# Patient Record
Sex: FEMALE | Race: WHITE | NOT HISPANIC OR LATINO | Employment: FULL TIME | URBAN - METROPOLITAN AREA
[De-identification: names, ages, dates, MRNs, and addresses within clinical notes are randomized per-mention and may not be internally consistent; named-entity substitution may affect disease eponyms.]

---

## 2021-11-17 ENCOUNTER — HOSPITAL ENCOUNTER (EMERGENCY)
Facility: CLINIC | Age: 36
Discharge: HOME | End: 2021-11-18
Attending: EMERGENCY MEDICINE | Admitting: EMERGENCY MEDICINE
Payer: COMMERCIAL

## 2021-11-17 DIAGNOSIS — R68.84 JAW PAIN: ICD-10-CM

## 2021-11-17 DIAGNOSIS — R42 LIGHTHEADEDNESS: ICD-10-CM

## 2021-11-17 DIAGNOSIS — F41.9 ANXIETY: ICD-10-CM

## 2021-11-17 PROCEDURE — 258N000003 HC RX IP 258 OP 636: Performed by: EMERGENCY MEDICINE

## 2021-11-17 PROCEDURE — 84443 ASSAY THYROID STIM HORMONE: CPT | Performed by: EMERGENCY MEDICINE

## 2021-11-17 PROCEDURE — 85610 PROTHROMBIN TIME: CPT | Performed by: EMERGENCY MEDICINE

## 2021-11-17 PROCEDURE — 96360 HYDRATION IV INFUSION INIT: CPT

## 2021-11-17 PROCEDURE — 93005 ELECTROCARDIOGRAM TRACING: CPT

## 2021-11-17 PROCEDURE — 84484 ASSAY OF TROPONIN QUANT: CPT | Performed by: EMERGENCY MEDICINE

## 2021-11-17 PROCEDURE — 80048 BASIC METABOLIC PNL TOTAL CA: CPT | Performed by: EMERGENCY MEDICINE

## 2021-11-17 PROCEDURE — 85025 COMPLETE CBC W/AUTO DIFF WBC: CPT | Performed by: EMERGENCY MEDICINE

## 2021-11-17 PROCEDURE — 99284 EMERGENCY DEPT VISIT MOD MDM: CPT | Mod: 25

## 2021-11-17 PROCEDURE — 36415 COLL VENOUS BLD VENIPUNCTURE: CPT | Performed by: EMERGENCY MEDICINE

## 2021-11-17 RX ADMIN — SODIUM CHLORIDE 1000 ML: 9 INJECTION, SOLUTION INTRAVENOUS at 23:57

## 2021-11-17 ASSESSMENT — MIFFLIN-ST. JEOR: SCORE: 1297.36

## 2021-11-18 VITALS
SYSTOLIC BLOOD PRESSURE: 150 MMHG | DIASTOLIC BLOOD PRESSURE: 89 MMHG | HEIGHT: 64 IN | TEMPERATURE: 97.9 F | RESPIRATION RATE: 16 BRPM | BODY MASS INDEX: 23.05 KG/M2 | WEIGHT: 135 LBS | OXYGEN SATURATION: 100 % | HEART RATE: 78 BPM

## 2021-11-18 LAB
ANION GAP SERPL CALCULATED.3IONS-SCNC: 6 MMOL/L (ref 3–14)
ATRIAL RATE - MUSE: 63 BPM
BASOPHILS # BLD AUTO: 0.1 10E3/UL (ref 0–0.2)
BASOPHILS NFR BLD AUTO: 1 %
BUN SERPL-MCNC: 10 MG/DL (ref 7–30)
CALCIUM SERPL-MCNC: 9 MG/DL (ref 8.5–10.1)
CHLORIDE BLD-SCNC: 107 MMOL/L (ref 94–109)
CO2 SERPL-SCNC: 25 MMOL/L (ref 20–32)
CREAT SERPL-MCNC: 0.68 MG/DL (ref 0.52–1.04)
DIASTOLIC BLOOD PRESSURE - MUSE: NORMAL MMHG
EOSINOPHIL # BLD AUTO: 0.1 10E3/UL (ref 0–0.7)
EOSINOPHIL NFR BLD AUTO: 1 %
ERYTHROCYTE [DISTWIDTH] IN BLOOD BY AUTOMATED COUNT: 12.7 % (ref 10–15)
GFR SERPL CREATININE-BSD FRML MDRD: >90 ML/MIN/1.73M2
GLUCOSE BLD-MCNC: 98 MG/DL (ref 70–99)
HCT VFR BLD AUTO: 40.7 % (ref 35–47)
HGB BLD-MCNC: 13.7 G/DL (ref 11.7–15.7)
IMM GRANULOCYTES # BLD: 0 10E3/UL
IMM GRANULOCYTES NFR BLD: 0 %
INR PPP: 0.97 (ref 0.85–1.15)
INTERPRETATION ECG - MUSE: NORMAL
LYMPHOCYTES # BLD AUTO: 4.2 10E3/UL (ref 0.8–5.3)
LYMPHOCYTES NFR BLD AUTO: 41 %
MCH RBC QN AUTO: 29.1 PG (ref 26.5–33)
MCHC RBC AUTO-ENTMCNC: 33.7 G/DL (ref 31.5–36.5)
MCV RBC AUTO: 87 FL (ref 78–100)
MONOCYTES # BLD AUTO: 0.6 10E3/UL (ref 0–1.3)
MONOCYTES NFR BLD AUTO: 6 %
NEUTROPHILS # BLD AUTO: 5.1 10E3/UL (ref 1.6–8.3)
NEUTROPHILS NFR BLD AUTO: 51 %
NRBC # BLD AUTO: 0 10E3/UL
NRBC BLD AUTO-RTO: 0 /100
P AXIS - MUSE: 22 DEGREES
PLATELET # BLD AUTO: 366 10E3/UL (ref 150–450)
POTASSIUM BLD-SCNC: 3.6 MMOL/L (ref 3.4–5.3)
PR INTERVAL - MUSE: 140 MS
QRS DURATION - MUSE: 76 MS
QT - MUSE: 434 MS
QTC - MUSE: 444 MS
R AXIS - MUSE: 66 DEGREES
RBC # BLD AUTO: 4.7 10E6/UL (ref 3.8–5.2)
SODIUM SERPL-SCNC: 138 MMOL/L (ref 133–144)
SYSTOLIC BLOOD PRESSURE - MUSE: NORMAL MMHG
T AXIS - MUSE: 36 DEGREES
TROPONIN I SERPL-MCNC: <0.015 UG/L (ref 0–0.04)
TSH SERPL DL<=0.005 MIU/L-ACNC: 2.04 MU/L (ref 0.4–4)
VENTRICULAR RATE- MUSE: 63 BPM
WBC # BLD AUTO: 10 10E3/UL (ref 4–11)

## 2021-11-18 RX ORDER — HYDROXYZINE HYDROCHLORIDE 25 MG/1
25-50 TABLET, FILM COATED ORAL 3 TIMES DAILY PRN
Qty: 20 TABLET | Refills: 0 | Status: SHIPPED | OUTPATIENT
Start: 2021-11-18

## 2021-11-18 ASSESSMENT — ENCOUNTER SYMPTOMS
FEVER: 0
COUGH: 0
NUMBNESS: 1
NERVOUS/ANXIOUS: 1
HEADACHES: 0
SHORTNESS OF BREATH: 0
ARTHRALGIAS: 1
CHILLS: 0
DIAPHORESIS: 1
SPEECH DIFFICULTY: 0
APPETITE CHANGE: 1
LIGHT-HEADEDNESS: 1
WEAKNESS: 0
VOMITING: 0
NAUSEA: 1

## 2021-11-18 NOTE — DISCHARGE INSTRUCTIONS
Please follow-up with your primary care provider as needed    Please return to the emergency department as needed for new or worsening symptoms including focal weakness, fainting, vomiting and unable to keep anything down, severe persistent confusion, any other concerning symptoms.

## 2021-11-18 NOTE — ED PROVIDER NOTES
History   Chief Complaint:  Facial Pain and Jaw Pain       The history is provided by the patient.      Mary Chavez is a 36 year old female with history of anxiety who presents with facial and jaw pain. Starting this evening after work while eating the patient began having pain in her left temple and left upper jaw. This pain then subsided but at 1730 when laying down she began to feel light headed along with having tunnel vision and numbness in her feet. After drinking some water this too subsided and she reports these symptoms are similar to when her anxiety has increased in the past. At 1930 the patient began to have similar symptoms to earlier along with diaphoresis and nausea. Her jaw pain then remained intermittently finally prompting her arrival to the ED. Upon evaluation in the ED her jaw pain has subsided. The patient denies any blurry vision, speech changes, weakness, or headaches. She additionally denies chest pain, shortness of breath, vomiting, fever, chills, or cough. The patient does reports that her brother passed away 3 weeks ago and since then she has been under increased stress and anxiety causing a lack of sleep and appetite. She also reports a family history of her father having a stroke in July at the age of 64 due to mild hypertension, tobacco use, and alcohol use. The patient is covid vaccinated.     Review of Systems   Constitutional: Positive for appetite change and diaphoresis. Negative for chills and fever.   Eyes: Positive for visual disturbance.   Respiratory: Negative for cough and shortness of breath.    Cardiovascular: Negative for chest pain.   Gastrointestinal: Positive for nausea. Negative for vomiting.   Musculoskeletal: Positive for arthralgias (left jaw pain).   Neurological: Positive for light-headedness and numbness (bilateral feet). Negative for speech difficulty, weakness and headaches.   Psychiatric/Behavioral: Positive for suicidal ideas. The patient is  "nervous/anxious.    All other systems reviewed and are negative.      Allergies:  Diatrizoate Meglumine  Penicillins    Medications:  buspirone   hydroxyzine     Past Medical History:      (spontaneous vaginal delivery)  Occiput posterior presentation of fetus  Gestational hypertension, third trimester  Anxiety  Missed   Vanishing twin syndrome  Acne  Female infertility    Past Surgical History:    [place] teeth extraction   Dilation and curettage     Family History:    Diabetes   Breast cancer   Pancreatic cancer   High cholesterol   Hypertension     Social History:  Presents with sister  PCP: Luzmaria, [facility]   No tobacco, alcohol, or drug use.     Physical Exam     Patient Vitals for the past 24 hrs:   BP Temp Temp src Pulse Resp SpO2 Height Weight   21 0030 -- -- -- -- -- 100 % -- --   21 0000 -- -- -- -- -- 99 % -- --   21 2115 (!) 150/89 97.9  F (36.6  C) Temporal 78 16 98 % 1.626 m (5' 4\") 61.2 kg (135 lb)       Physical Exam  Constitutional: Well developed, nontox appearance  Head: Atraumatic.  No facial tenderness  Mouth/Throat: Oropharynx is clear and moist. No trismus  Neck:  no stridor, no meningismus  Eyes: no scleral icterus, PERRL, EOMI  Cardiovascular: RRR, 2+ bilat radial pulses  Pulmonary/Chest: nml resp effort, Clear BS bilat  Abdominal: ND, soft, NT, no rebound or guarding   Ext: Warm, well perfused, no edema  Neurological: A&O,  CNII-XII intact, nml finger to nose, 5/5 strength throughout upper and lower ext, symmetric; sensation grossly intact  Skin: Skin is warm and dry.   Psychiatric: Behavior is normal. Thought content normal.   Nursing note and vitals reviewed.    Emergency Department Course   ECG  ECG obtained at 0005, ECG read at 0008  Normal sinus rhythm. Normal ECG.    No prior ECG to compare.   Rate 63 bpm. NC interval 140 ms. QRS duration 76 ms. QT/QTc 434/444 ms. P-R-T axes 22 66 36.     Laboratory:  Labs Ordered and Resulted from Time of ED " Arrival to Time of ED Departure   BASIC METABOLIC PANEL - Normal       Result Value    Sodium 138      Potassium 3.6      Chloride 107      Carbon Dioxide (CO2) 25      Anion Gap 6      Urea Nitrogen 10      Creatinine 0.68      Calcium 9.0      Glucose 98      GFR Estimate >90     INR - Normal    INR 0.97     TROPONIN I - Normal    Troponin I <0.015     TSH WITH FREE T4 REFLEX - Normal    TSH 2.04     CBC WITH PLATELETS AND DIFFERENTIAL    WBC Count 10.0      RBC Count 4.70      Hemoglobin 13.7      Hematocrit 40.7      MCV 87      MCH 29.1      MCHC 33.7      RDW 12.7      Platelet Count 366      % Neutrophils 51      % Lymphocytes 41      % Monocytes 6      % Eosinophils 1      % Basophils 1      % Immature Granulocytes 0      NRBCs per 100 WBC 0      Absolute Neutrophils 5.1      Absolute Lymphocytes 4.2      Absolute Monocytes 0.6      Absolute Eosinophils 0.1      Absolute Basophils 0.1      Absolute Immature Granulocytes 0.0      Absolute NRBCs 0.0         Emergency Department Course:    Reviewed:  I reviewed nursing notes, vitals, past medical history and Care Everywhere    Assessments:  2333 I obtained history and examined the patient as noted above.   0121 I rechecked the patient and explained findings.     Interventions:  2357 0.9% sodium chloride bolus, 1,000 mL, IV     Disposition:  The patient was discharged to home.     Impression & Plan     Medical Decision Makin year old female presenting w/ transient left-sided facial pain, paresthesias     DDx includes facial pain NOS, bruxism, generalized anxiety disorder, panic attack, anxiety response NOS, electrolyte abnormality, thyroid dysfunction, cardiac etiology although less likely given symptomatology and patient's reports she has had similar episodes previously with anxiety reaction.  EKG sig for NSR w/o acute ischemic findings.  Doubt CVA, vascular catastrophe such as sentinel intracranial hemorrhage or aneurysm, dissection given history and  physical exam particular with no neuro deficits noted.  Patient denies this was a worse headache of her life and it self resolved after a short period of time.  After discussion of risks and benefits of CT imaging, patient is comfortable not proceeding with CNS imaging given low likelihood for dangerous or life-threatening etiology.  Labs significant for no remarkable abnormality.  Given reassuring work-up, I feel the patient is safe for discharge.  She has been under significant stress lately is likely part of this for the majority of her symptoms are secondary to an anxiety response.  She does not have her prescription of hydroxyzine therefore another was prescribed as noted below.  Recommendations given regarding follow up with PCP and return to the emergency department as needed for new or worsening symptoms.  Pt counseled on all results, disposition and diagnosis.  They are understanding and agreeable to plan. Patient discharged in stable condition.    Diagnosis:    ICD-10-CM    1. Anxiety  F41.9    2. Lightheadedness  R42    3. Jaw pain  R68.84        Discharge Medications:  New Prescriptions    HYDROXYZINE (ATARAX) 25 MG TABLET    Take 1-2 tablets (25-50 mg) by mouth 3 times daily as needed for anxiety       Scribe Disclosure:  I, Brooklyn Byrd, am serving as a scribe at 11:31 PM on 11/17/2021 to document services personally performed by Garcia Hayward MD based on my observations and the provider's statements to me.            Garcia Hayward MD  11/18/21 0701

## 2021-11-18 NOTE — ED TRIAGE NOTES
Patient states at 530pm had some ear pain, jaw pain, pain in temple.  A little dizzy.  Went & laid down.  Pain now comes & goes to left temple.   Difficult to describe.  Some anxiety.  Brother passed away.  Not sleeping well or eating well.

## 2023-06-28 ENCOUNTER — HOSPITAL ENCOUNTER (EMERGENCY)
Facility: CLINIC | Age: 38
Discharge: HOME | End: 2023-06-28
Attending: EMERGENCY MEDICINE | Admitting: EMERGENCY MEDICINE
Payer: COMMERCIAL

## 2023-06-28 VITALS
SYSTOLIC BLOOD PRESSURE: 141 MMHG | OXYGEN SATURATION: 98 % | WEIGHT: 126 LBS | TEMPERATURE: 97.9 F | RESPIRATION RATE: 18 BRPM | BODY MASS INDEX: 21.51 KG/M2 | HEIGHT: 64 IN | HEART RATE: 66 BPM | DIASTOLIC BLOOD PRESSURE: 93 MMHG

## 2023-06-28 DIAGNOSIS — R42 LIGHTHEADEDNESS: ICD-10-CM

## 2023-06-28 DIAGNOSIS — R42 DIZZINESS: ICD-10-CM

## 2023-06-28 LAB
ANION GAP SERPL CALCULATED.3IONS-SCNC: 12 MMOL/L (ref 7–15)
BASOPHILS # BLD AUTO: 0 10E3/UL (ref 0–0.2)
BASOPHILS NFR BLD AUTO: 1 %
BUN SERPL-MCNC: 8.8 MG/DL (ref 6–20)
CALCIUM SERPL-MCNC: 10.2 MG/DL (ref 8.6–10)
CHLORIDE SERPL-SCNC: 103 MMOL/L (ref 98–107)
CREAT SERPL-MCNC: 0.71 MG/DL (ref 0.51–0.95)
DEPRECATED HCO3 PLAS-SCNC: 22 MMOL/L (ref 22–29)
EOSINOPHIL # BLD AUTO: 0 10E3/UL (ref 0–0.7)
EOSINOPHIL NFR BLD AUTO: 0 %
ERYTHROCYTE [DISTWIDTH] IN BLOOD BY AUTOMATED COUNT: 13.2 % (ref 10–15)
GFR SERPL CREATININE-BSD FRML MDRD: >90 ML/MIN/1.73M2
GLUCOSE SERPL-MCNC: 97 MG/DL (ref 70–99)
HCG SERPL QL: NEGATIVE
HCT VFR BLD AUTO: 43.3 % (ref 35–47)
HGB BLD-MCNC: 14.5 G/DL (ref 11.7–15.7)
HOLD SPECIMEN: NORMAL
IMM GRANULOCYTES # BLD: 0 10E3/UL
IMM GRANULOCYTES NFR BLD: 0 %
LYMPHOCYTES # BLD AUTO: 1.9 10E3/UL (ref 0.8–5.3)
LYMPHOCYTES NFR BLD AUTO: 23 %
MCH RBC QN AUTO: 29.8 PG (ref 26.5–33)
MCHC RBC AUTO-ENTMCNC: 33.5 G/DL (ref 31.5–36.5)
MCV RBC AUTO: 89 FL (ref 78–100)
MONOCYTES # BLD AUTO: 0.7 10E3/UL (ref 0–1.3)
MONOCYTES NFR BLD AUTO: 8 %
NEUTROPHILS # BLD AUTO: 5.6 10E3/UL (ref 1.6–8.3)
NEUTROPHILS NFR BLD AUTO: 68 %
NRBC # BLD AUTO: 0 10E3/UL
NRBC BLD AUTO-RTO: 0 /100
PLATELET # BLD AUTO: 289 10E3/UL (ref 150–450)
POTASSIUM SERPL-SCNC: 4 MMOL/L (ref 3.4–5.3)
RBC # BLD AUTO: 4.87 10E6/UL (ref 3.8–5.2)
SODIUM SERPL-SCNC: 137 MMOL/L (ref 136–145)
WBC # BLD AUTO: 8.2 10E3/UL (ref 4–11)

## 2023-06-28 PROCEDURE — 84703 CHORIONIC GONADOTROPIN ASSAY: CPT | Performed by: EMERGENCY MEDICINE

## 2023-06-28 PROCEDURE — 99284 EMERGENCY DEPT VISIT MOD MDM: CPT | Mod: 25

## 2023-06-28 PROCEDURE — 93005 ELECTROCARDIOGRAM TRACING: CPT

## 2023-06-28 PROCEDURE — 85025 COMPLETE CBC W/AUTO DIFF WBC: CPT | Performed by: EMERGENCY MEDICINE

## 2023-06-28 PROCEDURE — 80048 BASIC METABOLIC PNL TOTAL CA: CPT | Performed by: EMERGENCY MEDICINE

## 2023-06-28 PROCEDURE — 36415 COLL VENOUS BLD VENIPUNCTURE: CPT | Performed by: EMERGENCY MEDICINE

## 2023-06-28 PROCEDURE — 258N000003 HC RX IP 258 OP 636: Performed by: EMERGENCY MEDICINE

## 2023-06-28 PROCEDURE — 96360 HYDRATION IV INFUSION INIT: CPT

## 2023-06-28 RX ADMIN — SODIUM CHLORIDE 1000 ML: 9 INJECTION, SOLUTION INTRAVENOUS at 13:39

## 2023-06-28 NOTE — ED NOTES
"PIT/Triage Evaluation    Patient presented with intermittent episodes of lightheadedness since yesterday. Around the time of onset, she had just finished working out and did not have anything to since 0800 that morning. Each episode lasts about 5 to 10 minutes. Then earlier today, she was working at home when she noticed some flashing in her right eye. She describes the flash a vertical line in the central visual filed lasting 1 second without prince vision loss or discrete eye pain. She eventually decided to come ED, and noticed some stiffness in her right leg while walking to the car.  Another symptom mentioned was difficulty sleeping last night due to anxiety. She has a history of panic attacks, and suspects her anxiety is exacerbating her problems. She is currently prescribed hydroxyzine, but has not taken it yet today due to possibility of pregnancy. She denies headache, nausea, gait problems, arm drift, facial droop, or slurred speech.     Exam is notable for:    Patient Vitals for the past 24 hrs:   BP Temp Temp src Pulse Resp SpO2 Height Weight   06/28/23 1304 (!) 153/98 97.9  F (36.6  C) Temporal 72 18 99 % 1.626 m (5' 4\") 57.2 kg (126 lb)         HENT:   Oropharynx is moist  Eyes:    Conjunctiva normal     Visual fields intact.     No nystagmus  Neck:    Supple, no meningismus.     CV:     Regular rate and rhythm.      No murmurs, rubs or gallops.       No lower extremity edema.  PULM:    Clear to auscultation bilateral.       No respiratory distress.      Good air exchange.  ABD:    Soft, non-tender, non-distended.       No pulsatile masses.       No rebound, guarding or rigidity.  MSK:     No gross deformity to all four extremities.   LYMPH:   No cervical lymphadenopathy.  NEURO:   Alert and oriented x 3.      Cranial nerves II-XII intact.     Strength is 5/5 in all 4 extremities.     Sensation intact to all 4 extremities.     Finger to nose normal bilateral     Gait normal.  Skin:    Warm, dry and " intact.    Psych:    Mildly anxious      Appropriate interventions for symptom management were initiated if applicable.  Appropriate diagnostic tests were initiated if indicated.    Important information for subsequent clinician:    EKG without dysrhythmia.  Neurological examination is reassuring and not consistent with vertigo.  Basic laboratory studies and IVFs ordered.     I briefly evaluated the patient and developed an initial plan of care. I discussed this plan and explained that this brief interaction does not constitute a full evaluation. Patient/family understands that they should wait to be fully evaluated and discuss any test results with another clinician prior to leaving the hospital.     Km Brenner MD  06/28/23 9249       Km Brenner MD  06/28/23 2194

## 2023-06-28 NOTE — ED TRIAGE NOTES
"Pt with c/o lightheadedness \"like might pass\" out since yesterday after working out. Also reports an episode of R eye \"flashing\" today, that's since resolved. Denies HA, nausea, gait issues, arm drift, facial droop or slurred speech. Pt ate today. ABC intact.       "

## 2023-06-28 NOTE — ED PROVIDER NOTES
"  History     Chief Complaint:  Dizziness       The history is provided by the patient.      Mary Chavez is a 38 year old female who presented with intermittent episodes of lightheadedness since yesterday. Around the time of onset, she had just finished working out and did not have anything to since 0800 that morning. Each episode lasts about 5 to 10 minutes. Then earlier today, she was working at home when she noticed some flashing in her right eye. She describes the flash a vertical line in the central visual filed lasting 1 second without prince vision loss or discrete eye pain. She eventually decided to come ED, and noticed some stiffness in her right leg while walking to the car.  Another symptom mentioned was difficulty sleeping last night due to anxiety. She has a history of panic attacks, and suspects her anxiety is exacerbating her problems. She is currently prescribed hydroxyzine, but has not taken it yet today due to possibility of pregnancy. She denies headache, nausea, gait problems, arm drift, facial droop, or slurred speech.     Independent Historian:   None - Patient Only    Review of External Notes:   None    Medications:    Atarax     Past Medical History:    Gestational HTN   Anxiety   Female infertility     Past Surgical History:    [place] teeth extraction   D & C     Physical Exam     Patient Vitals for the past 24 hrs:   BP Temp Temp src Pulse Resp SpO2 Height Weight   06/28/23 1304 (!) 153/98 97.9  F (36.6  C) Temporal 72 18 99 % 1.626 m (5' 4\") 57.2 kg (126 lb)        Physical Exam  HENT:   Oropharynx is moist  Eyes:    Conjunctiva normal     EOMs and Visual fields intact.     No nystagmus  Neck:    Supple, no meningismus.     CV:     Regular rate and rhythm.      No murmurs, rubs or gallops.       No lower extremity edema.  PULM:    Clear to auscultation bilateral.       No respiratory distress.      Good air exchange.  MSK:     No gross deformity to all four extremities.   LYMPH:   No " cervical lymphadenopathy.  NEURO:   Alert and oriented x 3.      Cranial nerves II-XII intact.     Strength is 5/5 in all 4 extremities.     Sensation intact to all 4 extremities.     Finger to nose normal bilateral     Gait normal.     Test of skew normal  Skin:    Warm, dry and intact.    Psych:    Mildly anxious    Emergency Department Course   ECG:  ECG taken at 1254, ECG read at 1425  Normal sinus rhythm with sinus arrhythmia   Normal ECG   No significant chanes as compared to prior, dated 11/8/2021.  Rate 64 bpm. RI interval 138 ms. QRS duration 74 ms. QT/QTc 406/418 ms. P-R-T axes 61 62 38.     Laboratory:  Labs Ordered and Resulted from Time of ED Arrival to Time of ED Departure   BASIC METABOLIC PANEL - Abnormal       Result Value    Sodium 137      Potassium 4.0      Chloride 103      Carbon Dioxide (CO2) 22      Anion Gap 12      Urea Nitrogen 8.8      Creatinine 0.71      Calcium 10.2 (*)     Glucose 97      GFR Estimate >90     HCG QUALITATIVE PREGNANCY - Normal    hCG Serum Qualitative Negative     CBC WITH PLATELETS AND DIFFERENTIAL    WBC Count 8.2      RBC Count 4.87      Hemoglobin 14.5      Hematocrit 43.3      MCV 89      MCH 29.8      MCHC 33.5      RDW 13.2      Platelet Count 289      % Neutrophils 68      % Lymphocytes 23      % Monocytes 8      % Eosinophils 0      % Basophils 1      % Immature Granulocytes 0      NRBCs per 100 WBC 0      Absolute Neutrophils 5.6      Absolute Lymphocytes 1.9      Absolute Monocytes 0.7      Absolute Eosinophils 0.0      Absolute Basophils 0.0      Absolute Immature Granulocytes 0.0      Absolute NRBCs 0.0        Emergency Department Course & Assessments:    Interventions:  1339 NS 1 L IV      Assessments:  1319 I obtained history and examined the patient as noted above.  1434 I rechecked the patient and explained findings. I discussed plan for discharge home.    Independent Interpretation (X-rays, CTs, rhythm strip):  None    Consultations/Discussion of  Management or Tests:  None    Social Determinants of Health affecting care:   None    Disposition:  The patient was discharged to home.     Impression & Plan    Medical Decision Makin-year-old female presents with intermittent dizziness described as a lightheaded sensation associated with anxiety.  EKG is without dysrhythmia or cardiac conduction defects to draw increased concern for transient dysrhythmia.  She is not anemic, hypoglycemic or have a significant electrolyte disturbance to account for symptoms.  Evaluation is not consistent with vertigo, central or peripheral.  I suspect symptoms are related to acute anxiety.  No sinister pathology noted.  Patient will use hydroxyzine as needed.  Follow-up with PCP to ensure improvement and return to ED for any worsening symptoms.    Diagnosis:    ICD-10-CM    1. Lightheadedness  R42       2. Dizziness  R42            Scribe Disclosure:  I, Jose Perla, am serving as a scribe at 2:35 PM on 2023 to document services personally performed by Km Brenner MD based on my observations and the provider's statements to me.   2023   Km Brenner MD Matthews, Jeremiah R, MD  23 [id number]

## 2023-06-29 LAB
ATRIAL RATE - MUSE: 64 BPM
DIASTOLIC BLOOD PRESSURE - MUSE: NORMAL MMHG
INTERPRETATION ECG - MUSE: NORMAL
P AXIS - MUSE: 61 DEGREES
PR INTERVAL - MUSE: 138 MS
QRS DURATION - MUSE: 74 MS
QT - MUSE: 406 MS
QTC - MUSE: 418 MS
R AXIS - MUSE: 62 DEGREES
SYSTOLIC BLOOD PRESSURE - MUSE: NORMAL MMHG
T AXIS - MUSE: 38 DEGREES
VENTRICULAR RATE- MUSE: 64 BPM

## 2023-07-25 ENCOUNTER — APPOINTMENT (OUTPATIENT)
Dept: GENERAL RADIOLOGY | Facility: CLINIC | Age: 38
End: 2023-07-25
Attending: EMERGENCY MEDICINE
Payer: COMMERCIAL

## 2023-07-25 ENCOUNTER — HOSPITAL ENCOUNTER (EMERGENCY)
Facility: CLINIC | Age: 38
Discharge: HOME | End: 2023-07-25
Attending: EMERGENCY MEDICINE | Admitting: EMERGENCY MEDICINE
Payer: COMMERCIAL

## 2023-07-25 VITALS
HEART RATE: 72 BPM | DIASTOLIC BLOOD PRESSURE: 106 MMHG | RESPIRATION RATE: 18 BRPM | HEIGHT: 64 IN | BODY MASS INDEX: 22.36 KG/M2 | TEMPERATURE: 97.6 F | SYSTOLIC BLOOD PRESSURE: 159 MMHG | OXYGEN SATURATION: 98 % | WEIGHT: 130.95 LBS

## 2023-07-25 DIAGNOSIS — R00.2 PALPITATIONS: ICD-10-CM

## 2023-07-25 DIAGNOSIS — I10 HYPERTENSION, UNSPECIFIED TYPE: ICD-10-CM

## 2023-07-25 LAB
ANION GAP SERPL CALCULATED.3IONS-SCNC: 11 MMOL/L (ref 7–15)
BASOPHILS # BLD AUTO: 0.1 10E3/UL (ref 0–0.2)
BASOPHILS NFR BLD AUTO: 1 %
BUN SERPL-MCNC: 8.8 MG/DL (ref 6–20)
CALCIUM SERPL-MCNC: 10 MG/DL (ref 8.6–10)
CHLORIDE SERPL-SCNC: 103 MMOL/L (ref 98–107)
CREAT SERPL-MCNC: 0.69 MG/DL (ref 0.51–0.95)
DEPRECATED HCO3 PLAS-SCNC: 26 MMOL/L (ref 22–29)
EOSINOPHIL # BLD AUTO: 0 10E3/UL (ref 0–0.7)
EOSINOPHIL NFR BLD AUTO: 0 %
ERYTHROCYTE [DISTWIDTH] IN BLOOD BY AUTOMATED COUNT: 13.1 % (ref 10–15)
GFR SERPL CREATININE-BSD FRML MDRD: >90 ML/MIN/1.73M2
GLUCOSE SERPL-MCNC: 114 MG/DL (ref 70–99)
HCG SER QL IA.RAPID: NEGATIVE
HCT VFR BLD AUTO: 41.8 % (ref 35–47)
HGB BLD-MCNC: 14.1 G/DL (ref 11.7–15.7)
HOLD SPECIMEN: NORMAL
HOLD SPECIMEN: NORMAL
IMM GRANULOCYTES # BLD: 0 10E3/UL
IMM GRANULOCYTES NFR BLD: 0 %
LYMPHOCYTES # BLD AUTO: 2.1 10E3/UL (ref 0.8–5.3)
LYMPHOCYTES NFR BLD AUTO: 23 %
MAGNESIUM SERPL-MCNC: 1.9 MG/DL (ref 1.7–2.3)
MCH RBC QN AUTO: 30.1 PG (ref 26.5–33)
MCHC RBC AUTO-ENTMCNC: 33.7 G/DL (ref 31.5–36.5)
MCV RBC AUTO: 89 FL (ref 78–100)
MONOCYTES # BLD AUTO: 0.8 10E3/UL (ref 0–1.3)
MONOCYTES NFR BLD AUTO: 8 %
NEUTROPHILS # BLD AUTO: 6.2 10E3/UL (ref 1.6–8.3)
NEUTROPHILS NFR BLD AUTO: 68 %
NRBC # BLD AUTO: 0 10E3/UL
NRBC BLD AUTO-RTO: 0 /100
PLATELET # BLD AUTO: 304 10E3/UL (ref 150–450)
POTASSIUM SERPL-SCNC: 3.9 MMOL/L (ref 3.4–5.3)
RBC # BLD AUTO: 4.68 10E6/UL (ref 3.8–5.2)
SODIUM SERPL-SCNC: 140 MMOL/L (ref 136–145)
TROPONIN T SERPL HS-MCNC: <6 NG/L
TSH SERPL DL<=0.005 MIU/L-ACNC: 1.59 UIU/ML (ref 0.3–4.2)
WBC # BLD AUTO: 9.2 10E3/UL (ref 4–11)

## 2023-07-25 PROCEDURE — 80048 BASIC METABOLIC PNL TOTAL CA: CPT | Performed by: EMERGENCY MEDICINE

## 2023-07-25 PROCEDURE — 36415 COLL VENOUS BLD VENIPUNCTURE: CPT | Performed by: EMERGENCY MEDICINE

## 2023-07-25 PROCEDURE — 84484 ASSAY OF TROPONIN QUANT: CPT | Performed by: EMERGENCY MEDICINE

## 2023-07-25 PROCEDURE — 93005 ELECTROCARDIOGRAM TRACING: CPT

## 2023-07-25 PROCEDURE — 99285 EMERGENCY DEPT VISIT HI MDM: CPT | Mod: 25

## 2023-07-25 PROCEDURE — 84703 CHORIONIC GONADOTROPIN ASSAY: CPT

## 2023-07-25 PROCEDURE — 83735 ASSAY OF MAGNESIUM: CPT | Performed by: EMERGENCY MEDICINE

## 2023-07-25 PROCEDURE — 85025 COMPLETE CBC W/AUTO DIFF WBC: CPT | Performed by: EMERGENCY MEDICINE

## 2023-07-25 PROCEDURE — 84443 ASSAY THYROID STIM HORMONE: CPT | Performed by: EMERGENCY MEDICINE

## 2023-07-25 PROCEDURE — 71046 X-RAY EXAM CHEST 2 VIEWS: CPT

## 2023-07-25 ASSESSMENT — ACTIVITIES OF DAILY LIVING (ADL)
ADLS_ACUITY_SCORE: 33
ADLS_ACUITY_SCORE: 35

## 2023-07-25 NOTE — ED NOTES
"Tele-PIT/Intake Evaluation      Video-Visit Details    Type of service:  Video Visit    Video Start Time (time video started): 5:01 PM  Video End Time (time video stopped): 5:04 PM   Originating Location (pt. Location):  [facility]  Distant Location (provider location): [facility]  Mode of Communication:  Video Conference via [employer]  Patient verbally consented to [employer] televisit.    History:  38-year-old female with no pertinent past medical history who presents with palpitations while waiting for her  to get out of surgery today.  Patient chalked it up to anxiety but symptoms continue to return.  They last seconds in nature.  She has no shortness of breath associated with her symptoms.  Denies any syncope.  Notes that as she is reminding herself of what happened over the last few days at work she has had some brief episodes of palpitations that as well.  She noted on the monitor at home that she had a irregular heartbeat and a abnormally high blood pressure.  No personal history of hypertension, hyperlipidemia or diabetes.  She notes that when she was in triage she had some brief palpitations but is not having any during our initial intake examination.      Exam:  Patient Vitals for the past 24 hrs:   BP Temp Temp src Pulse Resp SpO2 Height Weight   07/25/23 1642 (!) 154/102 97.6  F (36.4  C) Temporal 73 18 99 % 1.626 m (5' 4\") 59.4 kg (130 lb 15.3 oz)   General: Resting comfortably on the gurney  Head:  The scalp, face, and head appear normal  Eyes:  The pupils are normal    Conjunctivae and sclera appear normal  ENT:    The nose is normal    Ears/pinnae are normal  Neck:  Normal range of motion  MS:  No deformities.   Skin:  No rash or lesions noted.  Neuro:  Speech is normal and fluent  Psych: Awake. Alert.  Normal affect.      Appropriate interactions    Appropriate interventions for symptom management were initiated if applicable.  Appropriate diagnostic tests were initiated " if indicated.    Important information for subsequent clinician:  38-year-old female who presents with palpitations.  Plan for EKG, laboratory work, and chest x-ray.  Await available room for further evaluation    I briefly evaluated the patient and developed an initial plan of care. I discussed this plan and explained that this brief interaction does not constitute a full evaluation. Patient/family understands that they should wait to be fully evaluated and discuss any test results with another clinician prior to leaving the hospital.       Emmanuel Mckeon MD  07/25/23 9244

## 2023-07-25 NOTE — ED TRIAGE NOTES
Presents to ED with intermittent palpitations since this morning. States she's had about 6 or 7 episodes where it skips a couple beats then feels back to normal. States she checked her BP at home and it was 170/117. Denies chest pain or SOB. ABCs intact. A&OX4.      Triage Assessment       Row Name 07/25/23 7718       Triage Assessment (Adult)    Airway WDL WDL       Respiratory WDL    Respiratory WDL WDL       Skin Circulation/Temperature WDL    Skin Circulation/Temperature WDL WDL       Cardiac WDL    Cardiac WDL WDL       Peripheral/Neurovascular WDL    Peripheral Neurovascular WDL WDL       Cognitive/Neuro/Behavioral WDL    Cognitive/Neuro/Behavioral WDL WDL

## 2023-07-26 LAB
ATRIAL RATE - MUSE: 75 BPM
DIASTOLIC BLOOD PRESSURE - MUSE: NORMAL MMHG
INTERPRETATION ECG - MUSE: NORMAL
P AXIS - MUSE: 50 DEGREES
PR INTERVAL - MUSE: 138 MS
QRS DURATION - MUSE: 80 MS
QT - MUSE: 418 MS
QTC - MUSE: 466 MS
R AXIS - MUSE: 61 DEGREES
SYSTOLIC BLOOD PRESSURE - MUSE: NORMAL MMHG
T AXIS - MUSE: 34 DEGREES
VENTRICULAR RATE- MUSE: 75 BPM

## 2023-07-26 NOTE — DISCHARGE INSTRUCTIONS
Diagnosis: Palpitations, elevated blood pressure  What do you do next:   Continue your home medications unless we have specifically changed them  Monitor your blood pressure once daily in the morning.  When you follow with your primary care clinic you can retest your blood pressure and if it is elevated they may consider beginning medication at that time.  You can also discuss a wearable heart monitor if you have persistent palpitations.  Follow up as indicated below    When do you return: If you have chest pain, shortness of breath, lightheadedness/fainting, or any other symptoms that concern you, please return to the ED for reevaluation.    Thank you for allowing us to care for you today.

## 2023-07-26 NOTE — ED PROVIDER NOTES
"  History     Chief Complaint:  Palpitations       The history is provided by the patient.      Mary Chavez is a 38 year old female who presents with palpitations. Patient reports she was sitting in the OR waiting room while her  was in surgery this morning, and began experiencing palpitations. She attributed this sensation to anxiety at the time, and left the OR to go take a walk, which resolved her symptoms. Some time later this afternoon, she began experiencing the same sensation again while at home. She checked her blood pressure cuff at home around 1500, which showed hypertensive pressures in the 170s/117, as well as an alert for an irregular rhythm. She notes she had an episode of PVC/PAC several years ago while training for a marathon, which felt similar to her symptoms today. No chest pain, nausea. Non-smoker and denies any alcohol use.       Independent Historian:    None - Patient Only    Review of External Notes:  ED note 6/28/23 - lightheadedness.  Patient is prescribed hydroxyzine for anxiety due to history of panic attacks.    Allergies:  Diatrizoate  Penicillins     Physical Exam   Patient Vitals for the past 24 hrs:   BP Temp Temp src Pulse Resp SpO2 Height Weight   07/25/23 1936 -- -- -- -- -- 98 % -- --   07/25/23 1935 -- -- -- -- -- 100 % -- --   07/25/23 1934 -- -- -- -- -- 99 % -- --   07/25/23 1933 -- -- -- -- -- 100 % -- --   07/25/23 1932 -- -- -- -- -- 100 % -- --   07/25/23 1931 -- -- -- -- -- 100 % -- --   07/25/23 1930 (!) 159/106 -- -- 72 -- 99 % -- --   07/25/23 1928 (!) 162/109 -- -- -- -- -- -- --   07/25/23 1926 -- -- -- -- -- 100 % -- --   07/25/23 1642 (!) 154/102 97.6  F (36.4  C) Temporal 73 18 99 % 1.626 m (5' 4\") 59.4 kg (130 lb 15.3 oz)        Physical Exam  Constitutional: Vital signs reviewed as above.   Head: No external signs of trauma noted.  Eyes: Pupils are equal, round, and reactive to light.   Neck: No JVD noted  Cardiovascular: normal rate, Regular rhythm " and normal heart sounds.  No murmur heard. Equal B/L peripheral pulses.  Pulmonary/Chest: Effort normal and breath sounds normal. No respiratory distress. Patient has no wheezes. Patient has no rales.   Gastrointestinal: Soft. There is no tenderness.   Musculoskeletal/Extremities: No pitting edema noted. Normal tone.  Neurological: Patient is alert and oriented to person, place, and time.   Skin: Skin is warm and dry. There is no diaphoresis noted.   Psychiatric: The patient appears somewhat anxious      Emergency Department Course   ECG  ECG results from 07/25/23   EKG 12 lead     Value    Systolic Blood Pressure     Diastolic Blood Pressure     Ventricular Rate 75    Atrial Rate 75    ND Interval 138    QRS Duration 80        QTc 466    P Axis 50    R AXIS 61    T Axis 34    Interpretation ECG      Sinus rhythm with sinus arrhythmia  Normal ECG  When compared with ECG of 28-JUN-2023 12:54,  QT has lengthened  Ready by: Dr Ti DO at 1729     Imaging:  XR Chest 2 Views   Final Result   IMPRESSION: Negative chest.         Report per radiology    Laboratory:  Labs Ordered and Resulted from Time of ED Arrival to Time of ED Departure   BASIC METABOLIC PANEL - Abnormal       Result Value    Sodium 140      Potassium 3.9      Chloride 103      Carbon Dioxide (CO2) 26      Anion Gap 11      Urea Nitrogen 8.8      Creatinine 0.69      Calcium 10.0      Glucose 114 (*)     GFR Estimate >90     TROPONIN T, HIGH SENSITIVITY - Normal    Troponin T, High Sensitivity <6     MAGNESIUM - Normal    Magnesium 1.9     TSH WITH FREE T4 REFLEX - Normal    TSH 1.59     ISTAT HCG QUALITATIVE PREGNANCY POCT - Normal    HCG Qualitative POCT Negative     CBC WITH PLATELETS AND DIFFERENTIAL    WBC Count 9.2      RBC Count 4.68      Hemoglobin 14.1      Hematocrit 41.8      MCV 89      MCH 30.1      MCHC 33.7      RDW 13.1      Platelet Count 304      % Neutrophils 68      % Lymphocytes 23      % Monocytes 8      % Eosinophils 0       % Basophils 1      % Immature Granulocytes 0      NRBCs per 100 WBC 0      Absolute Neutrophils 6.2      Absolute Lymphocytes 2.1      Absolute Monocytes 0.8      Absolute Eosinophils 0.0      Absolute Basophils 0.1      Absolute Immature Granulocytes 0.0      Absolute NRBCs 0.0        Emergency Department Course & Assessments:    Interventions:  Medications - No data to display     Assessments, Independent Interpretation, Consult/Discussion of ManagementTests:  ED Course as of 07/25/23 1943 Tue Jul 25, 2023 1932 Dr. Luke' evaluation.        Social Determinants of Health affecting care:  None    Disposition:  The patient was discharged to home.     Impression & Plan    CMS Diagnoses: None    Code Status: No Order    Medical Decision Making:  This 38-year-old female patient presents the ED due to palpitations as well as concern for hypertension.  Please see the HPI and exam for specifics.  A broad differential was considered.  She was initially seen in intake.  By the time of my evaluation her work-up had been completed and, fortunately, was reassuring.  Specifically her TSH and troponin along with electrolytes and hemoglobin were all normal.  The patient did not admit to anxiety in her history to me but I do see a prior charts where this is mentioned and the patient is prescribed hydroxyzine.  It is certainly possible that anxiety is contributing to her symptoms.  She also notes that she had a history of PVCs and given her symptoms today, it may be reasonable to consider an outpatient heart monitor.  The patient states she has a primary care appointment on Thursday and they can recheck her blood pressure at that point and decide if antihypertensive therapy is warranted.  She can always return with new or worsening symptoms.      Diagnosis:    ICD-10-CM    1. Palpitations  R00.2       2. Hypertension, unspecified type  I10            Discharge Medications:  New Prescriptions    No medications on file      Scribe  Disclosure:  I, GELY CALDERON, am serving as a scribe at 7:33 PM on 7/25/2023 to document services personally performed by Manpreet Luke DO based on my observations and the provider's statements to me.    7/25/2023   Manpreet Luke DO Burns, Bradley Joseph, DO  07/25/23 1943

## 2023-12-06 ENCOUNTER — APPOINTMENT (OUTPATIENT)
Dept: URBAN - METROPOLITAN AREA CLINIC 253 | Age: 38
Setting detail: DERMATOLOGY
End: 2023-12-14

## 2023-12-06 VITALS — RESPIRATION RATE: 14 BRPM | HEIGHT: 64 IN | WEIGHT: 124 LBS

## 2023-12-06 DIAGNOSIS — D49.2 NEOPLASM OF UNSPECIFIED BEHAVIOR OF BONE, SOFT TISSUE, AND SKIN: ICD-10-CM

## 2023-12-06 PROBLEM — D48.5 NEOPLASM OF UNCERTAIN BEHAVIOR OF SKIN: Status: ACTIVE | Noted: 2023-12-06

## 2023-12-06 PROCEDURE — OTHER PHOTO-DOCUMENTATION: OTHER

## 2023-12-06 PROCEDURE — 11102 TANGNTL BX SKIN SINGLE LES: CPT

## 2023-12-06 PROCEDURE — OTHER COUNSELING: OTHER

## 2023-12-06 PROCEDURE — 99202 OFFICE O/P NEW SF 15 MIN: CPT | Mod: 25

## 2023-12-06 PROCEDURE — OTHER MIPS QUALITY: OTHER

## 2023-12-06 PROCEDURE — OTHER ADDITIONAL NOTES: OTHER

## 2023-12-06 PROCEDURE — OTHER BIOPSY BY SHAVE METHOD: OTHER

## 2023-12-06 ASSESSMENT — LOCATION ZONE DERM: LOCATION ZONE: FACE

## 2023-12-06 ASSESSMENT — LOCATION SIMPLE DESCRIPTION DERM: LOCATION SIMPLE: RIGHT FOREHEAD

## 2023-12-06 ASSESSMENT — LOCATION DETAILED DESCRIPTION DERM: LOCATION DETAILED: RIGHT INFERIOR FOREHEAD

## 2023-12-06 NOTE — PROCEDURE: BIOPSY BY SHAVE METHOD
Detail Level: Detailed
Depth Of Biopsy: dermis
Was A Bandage Applied: Yes
Size Of Lesion In Cm: 0
Biopsy Type: H and E
Biopsy Method: Dermablade
Anesthesia Type: 1% lidocaine with epinephrine and a 1:10 solution of 8.4% sodium bicarbonate
Anesthesia Volume In Cc: 0.5
Hemostasis: Drysol
Wound Care: Aquaphor
Dressing: bandage
Type Of Destruction Used: Curettage
Curettage Text: The wound bed was treated with curettage after the biopsy was performed.
Cryotherapy Text: The wound bed was treated with cryotherapy after the biopsy was performed.
Electrodesiccation Text: The wound bed was treated with electrodesiccation after the biopsy was performed.
Electrodesiccation And Curettage Text: The wound bed was treated with electrodesiccation and curettage after the biopsy was performed.
Silver Nitrate Text: The wound bed was treated with silver nitrate after the biopsy was performed.
Lab: -[id number]
Render Path Notes In Note?: No
Consent: Written consent was obtained and risks were reviewed including but not limited to scarring, infection, bleeding, scabbing, incomplete removal, nerve damage and allergy to anesthesia.
Post-Care Instructions: I reviewed with the patient in detail post-care instructions. Patient is to keep the biopsy site dry overnight, and then apply bacitracin twice daily until healed. Patient may apply hydrogen peroxide soaks to remove any crusting.
Notification Instructions: Patient will be notified of biopsy results. However, patient instructed to call the office if not contacted within 2 weeks.
Billing Type: Third-Party Bill
Information: Selecting Yes will display possible errors in your note based on the variables you have selected. This validation is only offered as a suggestion for you. PLEASE NOTE THAT THE VALIDATION TEXT WILL BE REMOVED WHEN YOU FINALIZE YOUR NOTE. IF YOU WANT TO FAX A PRELIMINARY NOTE YOU WILL NEED TO TOGGLE THIS TO 'NO' IF YOU DO NOT WANT IT IN YOUR FAXED NOTE.

## 2023-12-06 NOTE — HPI: SKIN LESION
Is This A New Presentation, Or A Follow-Up?: Skin Lesions
Additional History: The lesions have been present for a while. She states they are not itchy, painful, or sensitive. She has not tried treating the lesions. She states the lesions are not dry, patchy, scaly, or flakey.

## 2023-12-06 NOTE — PROCEDURE: ADDITIONAL NOTES
Additional Notes: Informed patient that both lesions appear benign.\\nDiscussed removal options, including cautery, shave removal, or excision. Informed patient of risks and benefits of each of these options. Recommended patient trial treatment on one of the lesions to determine how they will heal. \\nThe patient elects to treat the lesion on the forehead with shave removal and cautery today. This will be sent into pathology for reassurance. \\nThe patient will follow up in one month to examine how the site heals. If the patient is satisfied, the lesion on the left eyebrow will be removed at this follow up appointment.
Detail Level: Simple
Render Risk Assessment In Note?: no

## 2024-01-08 ENCOUNTER — APPOINTMENT (OUTPATIENT)
Dept: URBAN - METROPOLITAN AREA CLINIC 253 | Age: 39
Setting detail: DERMATOLOGY
End: 2024-01-17

## 2024-01-08 VITALS — HEIGHT: 64 IN | WEIGHT: 124 LBS | RESPIRATION RATE: 14 BRPM

## 2024-01-08 PROBLEM — D48.5 NEOPLASM OF UNCERTAIN BEHAVIOR OF SKIN: Status: ACTIVE | Noted: 2024-01-08

## 2024-01-08 PROCEDURE — OTHER BIOPSY BY SHAVE METHOD: OTHER

## 2024-01-08 PROCEDURE — OTHER MIPS QUALITY: OTHER

## 2024-01-08 PROCEDURE — 11102 TANGNTL BX SKIN SINGLE LES: CPT

## 2024-01-08 NOTE — PROCEDURE: MIPS QUALITY
Quality 130: Documentation Of Current Medications In The Medical Record: Current Medications Documented
Detail Level: Detailed
Quality 431: Preventive Care And Screening: Unhealthy Alcohol Use - Screening: Patient not identified as an unhealthy alcohol user when screened for unhealthy alcohol use using a systematic screening method
Quality 226: Preventive Care And Screening: Tobacco Use: Screening And Cessation Intervention: Patient screened for tobacco use and is an ex/non-smoker
unknown

## 2024-01-08 NOTE — PROCEDURE: BIOPSY BY SHAVE METHOD

## 2024-01-08 NOTE — HPI: SKIN LESION
Is This A New Presentation, Or A Follow-Up?: Follow Up Skin Lesion
Additional History: Patient previously had this lesion checked and now wants it removed. We previously discussed cautery or shave removal as options. She presents today for evaluation and management.

## 2024-08-12 ENCOUNTER — APPOINTMENT (OUTPATIENT)
Dept: URBAN - METROPOLITAN AREA CLINIC 253 | Age: 39
Setting detail: DERMATOLOGY
End: 2024-08-15

## 2024-08-12 VITALS — RESPIRATION RATE: 14 BRPM | HEIGHT: 64 IN | WEIGHT: 126 LBS

## 2024-08-12 DIAGNOSIS — Z71.89 OTHER SPECIFIED COUNSELING: ICD-10-CM

## 2024-08-12 DIAGNOSIS — L82.1 OTHER SEBORRHEIC KERATOSIS: ICD-10-CM

## 2024-08-12 DIAGNOSIS — D18.0 HEMANGIOMA: ICD-10-CM

## 2024-08-12 DIAGNOSIS — L81.4 OTHER MELANIN HYPERPIGMENTATION: ICD-10-CM

## 2024-08-12 DIAGNOSIS — D22 MELANOCYTIC NEVI: ICD-10-CM

## 2024-08-12 PROBLEM — D22.9 MELANOCYTIC NEVI, UNSPECIFIED: Status: ACTIVE | Noted: 2024-08-12

## 2024-08-12 PROBLEM — D22.71 MELANOCYTIC NEVI OF RIGHT LOWER LIMB, INCLUDING HIP: Status: ACTIVE | Noted: 2024-08-12

## 2024-08-12 PROBLEM — D18.01 HEMANGIOMA OF SKIN AND SUBCUTANEOUS TISSUE: Status: ACTIVE | Noted: 2024-08-12

## 2024-08-12 PROBLEM — D23.5 OTHER BENIGN NEOPLASM OF SKIN OF TRUNK: Status: ACTIVE | Noted: 2024-08-12

## 2024-08-12 PROCEDURE — OTHER MIPS QUALITY: OTHER

## 2024-08-12 PROCEDURE — OTHER ADDITIONAL NOTES: OTHER

## 2024-08-12 PROCEDURE — OTHER COUNSELING: OTHER

## 2024-08-12 PROCEDURE — 99213 OFFICE O/P EST LOW 20 MIN: CPT

## 2024-08-12 ASSESSMENT — LOCATION SIMPLE DESCRIPTION DERM: LOCATION SIMPLE: RIGHT PRETIBIAL REGION

## 2024-08-12 ASSESSMENT — LOCATION DETAILED DESCRIPTION DERM: LOCATION DETAILED: RIGHT PROXIMAL PRETIBIAL REGION

## 2024-08-12 ASSESSMENT — LOCATION ZONE DERM: LOCATION ZONE: LEG

## 2024-08-12 NOTE — HPI: FULL BODY SKIN EXAMINATION
What Type Of Note Output Would You Prefer (Optional)?: Standard Output
What Is The Reason For Today's Visit?: Full Body Skin Examination with No Concerns
What Is The Reason For Today's Visit? (Being Monitored For X): concerning skin lesions on a periodic basis
Additional History: She reports one darker asymptomatic lesion on the right pretibial region.

## 2024-08-12 NOTE — PROCEDURE: ADDITIONAL NOTES
Detail Level: Simple
Additional Notes: Pattern is reassuring though this is darker than her other moles, recommended she monitor this and RTC if she notices any changes.
Render Risk Assessment In Note?: no